# Patient Record
Sex: FEMALE | Race: WHITE | NOT HISPANIC OR LATINO | URBAN - METROPOLITAN AREA
[De-identification: names, ages, dates, MRNs, and addresses within clinical notes are randomized per-mention and may not be internally consistent; named-entity substitution may affect disease eponyms.]

---

## 2017-06-28 ENCOUNTER — IMPORTED ENCOUNTER (OUTPATIENT)
Dept: URBAN - METROPOLITAN AREA CLINIC 1 | Facility: CLINIC | Age: 57
End: 2017-06-28

## 2017-06-28 PROBLEM — H52.4: Noted: 2017-06-28

## 2017-06-28 PROCEDURE — S0621 ROUTINE OPHTHALMOLOGICAL EXA: HCPCS

## 2017-06-28 NOTE — PATIENT DISCUSSION
1.  Presbyopia: Rx was given for corrective spectacles if indicated. 2.  RUTH ANN-OU-REC patient to use Refresh Optive PRN BID OU 3. Return for an appointment in 1 year for 40 and Contact lens check. with Dr. Estevan Bragg.

## 2018-09-24 ENCOUNTER — IMPORTED ENCOUNTER (OUTPATIENT)
Dept: URBAN - METROPOLITAN AREA CLINIC 1 | Facility: CLINIC | Age: 58
End: 2018-09-24

## 2018-09-24 PROBLEM — H52.4: Noted: 2018-09-24

## 2018-09-24 PROCEDURE — S0621 ROUTINE OPHTHALMOLOGICAL EXA: HCPCS

## 2018-09-24 NOTE — PATIENT DISCUSSION
1.  Presbyopia: Rx was given for corrective spectacles if indicated. 2.  Return for an appointment in 1 year for 40 and Contact lens check. with Dr. Beatris Plascencia.

## 2019-09-27 ENCOUNTER — IMPORTED ENCOUNTER (OUTPATIENT)
Dept: URBAN - METROPOLITAN AREA CLINIC 1 | Facility: CLINIC | Age: 59
End: 2019-09-27

## 2019-09-27 PROBLEM — H52.4: Noted: 2019-09-27

## 2019-09-27 PROBLEM — H52.13: Noted: 2019-09-27

## 2019-09-27 PROCEDURE — S0621 ROUTINE OPHTHALMOLOGICAL EXA: HCPCS

## 2019-09-27 NOTE — PATIENT DISCUSSION
1. Myopia / Presbyopia OU -- Finalized Glasses MRx was given to patient today for correction if indicated and requested. 2. Cataracts OU -- Observe. 3. Dry Eyes OU -- Recommend the frequent use of OTC AT's BID-QID OU Routinely. Finalized CTL Rx was given to patient today. Return for an appointment in 1 YR for a 36 / CC OU with Dr. Pranav Segovia. Discussed with patient best corrected vision will be achieved with glasses. Discussed with patient improving distance visual acuity may negatively affect near visual acuity and vice versa. Patient understands may need to wear OTC readers to read fine print.

## 2019-12-04 ENCOUNTER — IMPORTED ENCOUNTER (OUTPATIENT)
Dept: URBAN - METROPOLITAN AREA CLINIC 1 | Facility: CLINIC | Age: 59
End: 2019-12-04

## 2019-12-04 PROBLEM — H11.31: Noted: 2019-12-04

## 2019-12-04 PROCEDURE — 99213 OFFICE O/P EST LOW 20 MIN: CPT

## 2019-12-04 NOTE — PATIENT DISCUSSION
1.  Subconjunctival hemorrhage OD -- Reassurance given. Condition discussed with patient. Recommend cool compress PRN. Advised pt the importance of not sleeping in her contacts. 2.  Dry Eyes OU -- Recommend the frequent use of OTC AT's TID-QID OU (sample of refresh reliva given) Routinely. 3. Cataracts OU -- Observe. Return for an appointment in 1 year 40/cc for Return as scheduled with Dr. Jair Zafar.

## 2021-03-24 ENCOUNTER — IMPORTED ENCOUNTER (OUTPATIENT)
Dept: URBAN - METROPOLITAN AREA CLINIC 1 | Facility: CLINIC | Age: 61
End: 2021-03-24

## 2021-03-24 PROBLEM — H52.13: Noted: 2021-03-24

## 2021-03-24 PROBLEM — H52.222: Noted: 2021-03-24

## 2021-03-24 PROBLEM — H52.4: Noted: 2021-03-24

## 2021-03-24 PROCEDURE — S0621 ROUTINE OPHTHALMOLOGICAL EXA: HCPCS

## 2021-03-24 NOTE — PATIENT DISCUSSION
1. Myopia w/ Astigmatism OU -- Rx was given for correction if indicated and requested. 2. Presbyopia 3. Nuclear Cataract OU -- Observe. 4.  Dry Eyes OU -- Cont ATs BID-TID OU routinely. Finalized CTL Rx and given to patient (Biotrue MF). Return for an appointment in 1 year 40/cc with Dr. Marla Gordillo.

## 2022-04-02 ASSESSMENT — VISUAL ACUITY
OD_SC: 20/20
OS_SC: 20/20-1
OS_SC: 20/20
OS_CC: J1
OD_SC: 20/20
OS_SC: 20/20-1
OD_CC: J1
OS_CC: J1
OD_SC: J1
OD_SC: 20/20
OS_SC: J1
OD_CC: J1
OD_SC: 20/20
OD_SC: 20/20
OS_SC: 20/20
OS_SC: 20/20

## 2022-04-02 ASSESSMENT — TONOMETRY
OD_IOP_MMHG: 15
OD_IOP_MMHG: 15
OD_IOP_MMHG: 14
OS_IOP_MMHG: 15
OD_IOP_MMHG: 16
OS_IOP_MMHG: 15
OD_IOP_MMHG: 14
OS_IOP_MMHG: 17

## 2022-05-06 ENCOUNTER — COMPREHENSIVE EXAM (OUTPATIENT)
Dept: URBAN - METROPOLITAN AREA CLINIC 1 | Facility: CLINIC | Age: 62
End: 2022-05-06

## 2022-05-06 DIAGNOSIS — H52.222: ICD-10-CM

## 2022-05-06 DIAGNOSIS — H52.4: ICD-10-CM

## 2022-05-06 DIAGNOSIS — H52.13: ICD-10-CM

## 2022-05-06 PROCEDURE — 92310 CONTACT LENS FITTING OU: CPT

## 2022-05-06 PROCEDURE — 92014 COMPRE OPH EXAM EST PT 1/>: CPT

## 2022-05-06 ASSESSMENT — VISUAL ACUITY
OD_CC: 20/20
OS_CC: 20/20
OS_CC: J1
OD_CC: J1

## 2022-05-06 ASSESSMENT — TONOMETRY
OS_IOP_MMHG: 16
OD_IOP_MMHG: 16

## 2023-07-06 ENCOUNTER — COMPREHENSIVE EXAM (OUTPATIENT)
Dept: URBAN - METROPOLITAN AREA CLINIC 1 | Facility: CLINIC | Age: 63
End: 2023-07-06

## 2023-07-06 DIAGNOSIS — H52.13: ICD-10-CM

## 2023-07-06 DIAGNOSIS — H52.4: ICD-10-CM

## 2023-07-06 DIAGNOSIS — H52.222: ICD-10-CM

## 2023-07-06 PROCEDURE — 92015 DETERMINE REFRACTIVE STATE: CPT

## 2023-07-06 PROCEDURE — 92014 COMPRE OPH EXAM EST PT 1/>: CPT

## 2023-07-06 ASSESSMENT — VISUAL ACUITY
OS_CC: J1+
OD_CC: 20/20
OD_CC: J1+
OD_CC: J1+
OS_CC: J1+
OS_CC: 20/25+1
OS_CC: 20/20
OD_CC: 20/20

## 2023-07-06 ASSESSMENT — TONOMETRY
OD_IOP_MMHG: 16
OS_IOP_MMHG: 15

## 2023-07-26 ENCOUNTER — EMERGENCY VISIT (OUTPATIENT)
Dept: URBAN - METROPOLITAN AREA CLINIC 1 | Facility: CLINIC | Age: 63
End: 2023-07-26

## 2023-07-26 DIAGNOSIS — H43.811: ICD-10-CM

## 2023-07-26 PROCEDURE — 92012 INTRM OPH EXAM EST PATIENT: CPT

## 2023-07-26 ASSESSMENT — TONOMETRY
OS_IOP_MMHG: 15
OD_IOP_MMHG: 15

## 2023-07-26 ASSESSMENT — VISUAL ACUITY
OD_CC: J1+
OS_CC: 20/20
OS_CC: J1
OD_CC: 20/20-1

## 2023-08-28 ENCOUNTER — FOLLOW UP (OUTPATIENT)
Dept: URBAN - METROPOLITAN AREA CLINIC 1 | Facility: CLINIC | Age: 63
End: 2023-08-28

## 2023-08-28 DIAGNOSIS — H43.811: ICD-10-CM

## 2023-08-28 PROCEDURE — 92012 INTRM OPH EXAM EST PATIENT: CPT

## 2023-08-28 ASSESSMENT — VISUAL ACUITY
OD_CC: 20/20
OS_CC: 20/20

## 2023-08-28 ASSESSMENT — TONOMETRY: OD_IOP_MMHG: 15

## 2024-12-11 ENCOUNTER — COMPREHENSIVE EXAM (OUTPATIENT)
Age: 64
End: 2024-12-11

## 2024-12-11 DIAGNOSIS — Z46.0: ICD-10-CM

## 2024-12-11 DIAGNOSIS — H52.4: ICD-10-CM

## 2024-12-11 DIAGNOSIS — H52.13: ICD-10-CM

## 2024-12-11 DIAGNOSIS — Z01.00: ICD-10-CM

## 2024-12-11 PROCEDURE — 92014 COMPRE OPH EXAM EST PT 1/>: CPT

## 2024-12-11 PROCEDURE — 92310-3 LEVEL 3 SOFT LENS UPDATE

## 2024-12-11 PROCEDURE — 92015 DETERMINE REFRACTIVE STATE: CPT
